# Patient Record
Sex: FEMALE | Race: WHITE | NOT HISPANIC OR LATINO | Employment: FULL TIME | URBAN - METROPOLITAN AREA
[De-identification: names, ages, dates, MRNs, and addresses within clinical notes are randomized per-mention and may not be internally consistent; named-entity substitution may affect disease eponyms.]

---

## 2022-03-08 ENCOUNTER — TELEPHONE (OUTPATIENT)
Dept: FAMILY MEDICINE CLINIC | Facility: CLINIC | Age: 22
End: 2022-03-08

## 2022-05-19 ENCOUNTER — TELEPHONE (OUTPATIENT)
Dept: FAMILY MEDICINE CLINIC | Facility: CLINIC | Age: 22
End: 2022-05-19

## 2022-05-19 NOTE — TELEPHONE ENCOUNTER
Caller: Mago Israel    Relationship: Self    Best call back number: [phone]    What form or medical record are you requesting: ACCOMODATION FORM    Who is requesting this form or medical record from you: EMPLOYER    How would you like to receive the form or medical records (pick-up, mail, fax): FAX  If fax, what is the fax number: NUMBER ON FORM    Timeframe paperwork needed: DUE Monday 5/23/22     Additional notes: PATIENT STATES THAT THEIR EMPLOYER IS REQUESTING AN UPDATE TO CLARIFY SPECIFICS OF ADDITIONAL BREAKS AT WORK. PATIENT REQUESTS THAT IT BE LISTED TO INCLUDE AN ADDITIONAL 15-20 MINUTES FOR A RESTROOM BREAK. CALL IF NECESSARY

## 2022-05-23 ENCOUNTER — TELEPHONE (OUTPATIENT)
Dept: FAMILY MEDICINE CLINIC | Facility: CLINIC | Age: 22
End: 2022-05-23

## 2022-05-23 NOTE — TELEPHONE ENCOUNTER
Caller: Mago Israel    Relationship to patient: Self    Best call back number:[phone]    Patient is needing: PATIENT IS CALLING TO CHECK THE STATUS OF AN ACCOMODATION FORM THAT SHE DROPPED OFF ON Friday 05/20/22.    PLEASE ADVISE.

## 2023-06-07 NOTE — TELEPHONE ENCOUNTER
Caller: JS     Relationship: [facility] INSURANCE AUTHORIZATIONS     Best call back number: [phone]    What test/procedure requested: CT OF ABDOMEN AND PELVIS WITHOUT CONTRAST      When is it needed: SCHEDULED 3-9-22     Where is the test/procedure going to be performed: [facility]     Additional information or concerns: DENIED BY INSURANCE. PLEASE CALL JS BY 2:00 TODAY, 3-8-22 AND ADVISE IF PROVIDER REQUESTS PEER TO PEER MEETING,  MAKE A CHANGE IN THE ORDER OR CANCEL PROCEDURE            Dr. Dionne Jeong

## 2023-11-11 NOTE — PROGRESS NOTES
"GYN Annual Exam     CC- Here for annual exam   Chief Complaint   Patient presents with    Gynecologic Exam     ANNUAL          Mago CORREA is a 23 y.o.  female who presents for annual well woman exam. No LMP recorded. Patient has had an implant.    Problems in addition to need for annual: pt desires Mirena IUD out, and wants to start progestin only pill.  Pt would also like to be tested for PCOS.      HPI: History of Present Illness    PMHX:    * No active hospital problems. *  ; otherwise none    OB History          0    Para   0    Term   0       0    AB   0    Living   0         SAB   0    IAB   0    Ectopic   0    Molar        Multiple   0    Live Births                      Past Medical History:   Diagnosis Date    Abnormal vaginal bleeding        No past surgical history on file.      Current Outpatient Medications:     norethindrone (MICRONOR) 0.35 MG tablet, Take 1 tablet by mouth Daily., Disp: 84 tablet, Rfl: 3    omeprazole (PrilOSEC) 20 MG capsule, Take 1 capsule by mouth Daily., Disp: 30 capsule, Rfl: 1    No Known Allergies    Social History     Tobacco Use    Smoking status: Every Day     Packs/day: 0.25     Years: 1.00     Additional pack years: 0.00     Total pack years: 0.25     Types: Cigarettes    Smokeless tobacco: Never   Substance Use Topics    Alcohol use: No    Drug use: No       Mago CORREA  reports that she has been smoking cigarettes. She has a 0.25 pack-year smoking history. She has never used smokeless tobacco..       Family History   Problem Relation Age of Onset    Other Mother         GYN PROBLEMS    Other Maternal Grandmother         GYN PROBLEMS    Other Paternal Grandmother         GYN PROBLEMS       Review of Systems    Patient reports that she is not currently experiencing any symptoms of urinary incontinence.      noTESTED FOR CHLAMYDIA?    EXAM:  /78   Ht 157.5 cm (62\")   Wt 95.9 kg (211 lb 6.4 oz)   BMI 38.67 kg/m²     Labs:   Lab Results (last 24 " hours)       Procedure Component Value Units Date/Time    POC Urinalysis Dipstick [[id number]]  (Normal) Collected: 11/13/23 1133    Specimen: Urine Updated: 11/13/23 1133     Color Yellow     Clarity, UA Clear     Glucose, UA Negative mg/dL      Bilirubin Negative     Ketones, UA Negative     Specific Gravity  1.005     Blood, UA Negative     pH, Urine 5.0     Protein, POC Negative mg/dL      Urobilinogen, UA Normal     Leukocytes Negative     Nitrite, UA Negative            Physical Exam  Vitals and nursing note reviewed. Exam conducted with a chaperone present.   Constitutional:       General: She is not in acute distress.     Appearance: She is well-developed. She is not diaphoretic.   HENT:      Head: Normocephalic and atraumatic.      Nose: Nose normal.   Eyes:      Extraocular Movements: Extraocular movements intact.   Cardiovascular:      Rate and Rhythm: Normal rate.   Pulmonary:      Effort: Pulmonary effort is normal.   Chest:   Breasts:     Breasts are symmetrical.      Right: Normal. No mass, nipple discharge, skin change or tenderness.      Left: Normal. No mass, nipple discharge, skin change or tenderness.   Abdominal:      General: There is no distension.      Palpations: Abdomen is soft. There is no mass.      Tenderness: There is no abdominal tenderness. There is no guarding.   Genitourinary:     General: Normal vulva.      Pubic Area: No rash.       Vagina: Normal. No vaginal discharge.      Cervix: Normal.      Uterus: Normal.       Adnexa: Right adnexa normal and left adnexa normal.   Musculoskeletal:         General: No tenderness or deformity. Normal range of motion.      Cervical back: Normal range of motion.   Lymphadenopathy:      Upper Body:      Right upper body: No axillary adenopathy.      Left upper body: No axillary adenopathy.   Skin:     General: Skin is warm and dry.      Coloration: Skin is not pale.      Findings: No erythema or rash.   Neurological:      Mental Status: She is  alert and oriented to person, place, and time.   Psychiatric:         Behavior: Behavior normal.         Thought Content: Thought content normal.         Judgment: Judgment normal.       IUD Removal Procedure Note    Chief Complaint: IUD removal    Procedures    Type of IUD:  Mirena  Date of insertion:  known  Reason for removal:   pt choice  Other relevant history/information:  none    Procedure Time Out Documentation      Procedure Details  IUD strings visible:  yes  Local anesthesia:  None  Tenaculum used:  None  Removal:  IUD strings grasped and IUD removed intact with gentle traction.  The patient tolerated the procedure well.    All appropriate instructions regarding removal were reviewed.    Tolerated well  No apparent complications  Post procedure diagnosis : IUD removal     Plans for contraception:  Micronor      Other follow-up needed:  none    The patient was advised to call for any fever or for prolonged or severe pain or bleeding. She was advised to use OTC ibuprofen as needed for mild to moderate pain.     As part of wellness and prevention, the following topics were discussed with the patient: healthy weight, substance abuse/misuse, mental health, encouraging self breast exam, and other counseling and guidance done:  Nutrition, physical activity, healthy weight, injury prevention, misuse of tobacco, alcohol and drugs, sexual behavior and STDs, contraception, dental health, mental health, immunizations breast cancer screening and exams.    Assessment     1) GYN annual well woman exam.   2) PAP done today? Yes  3) problems addressed: yes           Plan       Follow up prn or one year.    Pt instructed to call for results of any testing done today and that failure to call if she has not heard from us could result in inadequate treatment.  Pt verbalized her understanding.     Diagnoses and all orders for this visit:    1. Pap smear, as part of routine gynecological examination (Primary)  -      IGP,CtNgTv,rfx Aptima HPV ASCU    2. Routine gynecological examination  -     POC Urinalysis Dipstick  -     IGP,CtNgTv,rfx Aptima HPV ASCU    3. PCOS (polycystic ovarian syndrome)  -     17-Hydroxyprogesterone  -     DHEA-Sulfate  -     Estradiol  -     Follicle Stimulating Hormone  -     Glucose, Plasma ([facility])  -     Insulin, Total  -     Prolactin  -     TSH Rfx On Abnormal To Free T4  -     Testosterone Free Direct    4. Encounter for IUD removal    5. Encounter for initial prescription of contraceptive pills    Other orders  -     norethindrone (MICRONOR) 0.35 MG tablet; Take 1 tablet by mouth Daily.  Dispense: 84 tablet; Refill: 3        RTO Return if symptoms worsen or fail to improve, for Recheck.        Reymundo Silva MD  11/13/23  13:13 EST

## 2023-11-13 ENCOUNTER — OFFICE VISIT (OUTPATIENT)
Dept: OBSTETRICS AND GYNECOLOGY | Facility: CLINIC | Age: 23
End: 2023-11-13
Payer: COMMERCIAL

## 2023-11-13 VITALS
HEIGHT: 62 IN | BODY MASS INDEX: 38.9 KG/M2 | SYSTOLIC BLOOD PRESSURE: 126 MMHG | DIASTOLIC BLOOD PRESSURE: 78 MMHG | WEIGHT: 211.4 LBS

## 2023-11-13 DIAGNOSIS — Z30.432 ENCOUNTER FOR IUD REMOVAL: ICD-10-CM

## 2023-11-13 DIAGNOSIS — Z01.419 ROUTINE GYNECOLOGICAL EXAMINATION: ICD-10-CM

## 2023-11-13 DIAGNOSIS — E28.2 PCOS (POLYCYSTIC OVARIAN SYNDROME): ICD-10-CM

## 2023-11-13 DIAGNOSIS — Z30.011 ENCOUNTER FOR INITIAL PRESCRIPTION OF CONTRACEPTIVE PILLS: ICD-10-CM

## 2023-11-13 DIAGNOSIS — Z01.419 PAP SMEAR, AS PART OF ROUTINE GYNECOLOGICAL EXAMINATION: Primary | ICD-10-CM

## 2023-11-13 LAB
BILIRUB BLD-MCNC: NEGATIVE MG/DL
CLARITY, POC: CLEAR
COLOR UR: YELLOW
GLUCOSE UR STRIP-MCNC: NEGATIVE MG/DL
KETONES UR QL: NEGATIVE
LEUKOCYTE EST, POC: NEGATIVE
NITRITE UR-MCNC: NEGATIVE MG/ML
PH UR: 5 [PH] (ref 5–8)
PROT UR STRIP-MCNC: NEGATIVE MG/DL
RBC # UR STRIP: NEGATIVE /UL
SP GR UR: 1 (ref 1–1.03)
UROBILINOGEN UR QL: NORMAL

## 2023-11-13 RX ORDER — ACETAMINOPHEN AND CODEINE PHOSPHATE 120; 12 MG/5ML; MG/5ML
1 SOLUTION ORAL DAILY
Qty: 84 TABLET | Refills: 3 | Status: SHIPPED | OUTPATIENT
Start: 2023-11-13

## 2023-11-15 LAB
C TRACH RRNA CVX QL NAA+PROBE: NEGATIVE
CONV .: NORMAL
CYTOLOGIST CVX/VAG CYTO: NORMAL
CYTOLOGY CVX/VAG DOC CYTO: NORMAL
CYTOLOGY CVX/VAG DOC THIN PREP: NORMAL
DX ICD CODE: NORMAL
HIV 1 & 2 AB SER-IMP: NORMAL
N GONORRHOEA RRNA CVX QL NAA+PROBE: NEGATIVE
OTHER STN SPEC: NORMAL
STAT OF ADQ CVX/VAG CYTO-IMP: NORMAL
T VAGINALIS RRNA SPEC QL NAA+PROBE: NEGATIVE

## 2023-11-16 LAB
17OHP SERPL-MCNC: 62 NG/DL
DHEA-S SERPL-MCNC: 352 UG/DL (ref 84.8–378)
ESTRADIOL SERPL-MCNC: 40.6 PG/ML
FSH SERPL-ACNC: 5.7 MIU/ML
GLUCOSE P FAST SERPL-MCNC: 88 MG/DL (ref 65–99)
INSULIN SERPL-ACNC: 19.4 UIU/ML (ref 2.6–24.9)
PROLACTIN SERPL-MCNC: 17.8 NG/ML (ref 4.8–23.3)
TESTOST FREE SERPL-MCNC: 4.5 PG/ML (ref 0–4.2)
TSH SERPL DL<=0.005 MIU/L-ACNC: 3.62 UIU/ML (ref 0.27–4.2)

## 2024-01-16 ENCOUNTER — OFFICE VISIT (OUTPATIENT)
Dept: OBSTETRICS AND GYNECOLOGY | Facility: CLINIC | Age: 24
End: 2024-01-16
Payer: COMMERCIAL

## 2024-01-16 VITALS
SYSTOLIC BLOOD PRESSURE: 118 MMHG | BODY MASS INDEX: 38.02 KG/M2 | WEIGHT: 206.6 LBS | DIASTOLIC BLOOD PRESSURE: 82 MMHG | HEIGHT: 62 IN

## 2024-01-16 DIAGNOSIS — Z30.011 ENCOUNTER FOR INITIAL PRESCRIPTION OF CONTRACEPTIVE PILLS: ICD-10-CM

## 2024-01-16 DIAGNOSIS — N93.9 ABNORMAL UTERINE BLEEDING (AUB): Primary | ICD-10-CM

## 2024-01-16 DIAGNOSIS — E28.2 PCOS (POLYCYSTIC OVARIAN SYNDROME): ICD-10-CM

## 2024-01-16 RX ORDER — DROSPIRENONE AND ETHINYL ESTRADIOL 0.03MG-3MG
1 KIT ORAL DAILY
Qty: 84 TABLET | Refills: 3 | Status: SHIPPED | OUTPATIENT
Start: 2024-01-16

## 2024-01-16 RX ORDER — LISDEXAMFETAMINE DIMESYLATE CAPSULES 50 MG/1
50 CAPSULE ORAL
COMMUNITY
Start: 2024-01-11 | End: 2024-02-10

## 2024-01-16 NOTE — PROGRESS NOTES
"Subjective     Chief Complaint   Patient presents with    BLEEDING FOR A LONG AMOUNT OF TIME       Mago CORREA is a 23 y.o.  whose LMP is Patient's last menstrual period was 2023 (exact date).. This patient is new to me- yes, but is an established patient of this practice.  She presents with prolonged bleeding.     HPI    IUD removed 23; started Micronor at that time.  Started spotting prior to removal but never had a cycle with the IUD.  Cycle started 5 days later and ended on 23.  Was off cycle for 2 weeks then started again 23; consistent heavy bleeding (some clots) without a lot of cramping.  Bleeding through tampons and pads.  Cycle eventually stopped 24.  Reports \"bad\" cramps prior to IUD placement.  Wanted IUD removed due to mental health decline.  Pelvic US on day of IUD removal showed PCOS-like ovaries.  Placed on the mini pill because she wanted less hormones but has since had prolonged bleeding.  PCOS labs were drawn the day she had the IUD removed; labs were normal.  Took Enskyce when she was younger due to abnormal cycles.  Also reporting pain with sex with deep penetration or \"hitting the cervix\".          The following portions of the patient's history were reviewed and updated as appropriate:vital signs, allergies, current medications, past family history, past medical history, past social history, past surgical history, and problem list      Review of Systems     Review of Systems   Eyes:  Negative for visual disturbance.   Genitourinary:  Positive for dyspareunia, menstrual problem and pelvic pain. Negative for vaginal discharge.   Neurological:  Negative for headache.       Objective      /82   Ht 157.5 cm (62\")   Wt 93.7 kg (206 lb 9.6 oz)   LMP 2023 (Exact Date)   BMI 37.79 kg/m²     Physical Exam    Physical Exam  Vitals reviewed.   Constitutional:       General: She is awake. She is not in acute distress.     Appearance: She is obese. She is " not ill-appearing.   Eyes:      Conjunctiva/sclera: Conjunctivae normal.   Pulmonary:      Effort: Pulmonary effort is normal. No respiratory distress.   Musculoskeletal:      Cervical back: Neck supple. No rigidity.   Skin:     General: Skin is warm and dry.      Capillary Refill: Capillary refill takes less than 2 seconds.   Neurological:      Mental Status: She is alert and oriented to person, place, and time.   Psychiatric:         Mood and Affect: Mood and affect normal.         Behavior: Behavior normal.           Lab Review   Labs: No data reviewed     Imaging: No data reviewed      Assessment/Plan  Diagnoses and all orders for this visit:    1. Abnormal uterine bleeding (AUB) (Primary)  -     GLORIA  -     Anticardiolipin Antibody, IgG / M, Qn  -     Antithrombin III  -     Factor 5 Leiden  -     Factor II, DNA Analysis  -     Protein C Deficiency Profile  -     Protein S Panel    2. Encounter for initial prescription of contraceptive pills  -     drospirenone-ethinyl estradiol (MAE,OCELLA) 3-0.03 MG per tablet; Take 1 tablet by mouth Daily.  Dispense: 84 tablet; Refill: 3    3. PCOS (polycystic ovarian syndrome)  -     drospirenone-ethinyl estradiol (MAE,OCELLA) 3-0.03 MG per tablet; Take 1 tablet by mouth Daily.  Dispense: 84 tablet; Refill: 3    Check AUB labs and thrombophilia panel.  Instructed to stop taking Micronor; agrees to try birth control with estrogen hormone.  Reviewed ACHES and warning signs.      Return in about 3 months (around 4/16/2024), or if needed.    I spent 30 minutes caring for Mago on this date of service. This time includes time spent by me in the following activities: preparing for the visit, reviewing tests, obtaining and/or reviewing a separately obtained history, performing a medically appropriate examination and/or evaluation, counseling and educating the patient/family/caregiver, ordering medications, tests, or procedures, and documenting information in the medical  record. This time does not include time spent performing the Ultrasound.    Juliet Mc, APRN  1/17/2024  13:14 EST

## 2024-01-25 LAB
ANA SER QL: NEGATIVE
AT III ACT/NOR PPP CHRO: 110 % (ref 75–135)
CARDIOLIPIN IGG SER IA-ACNC: <9 GPL U/ML (ref 0–14)
CARDIOLIPIN IGM SER IA-ACNC: <9 MPL U/ML (ref 0–12)
F2 C.20210G>A GENO BLD/T: NORMAL
F5 P.R506Q BLD/T QL: NORMAL
IMP & REVIEW OF LAB RESULTS: NORMAL
IMP & REVIEW OF LAB RESULTS: NORMAL
PROT C ACT/NOR PPP: 97 % (ref 73–180)
PROT C AG ACT/NOR PPP IA: 75 % (ref 60–150)
PROT S ACT/NOR PPP: 118 % (ref 63–140)
PROT S AG ACT/NOR PPP IA: 132 % (ref 60–150)
PROT S FREE AG ACT/NOR PPP IA: 124 % (ref 61–136)

## 2024-04-12 ENCOUNTER — OFFICE VISIT (OUTPATIENT)
Dept: OBSTETRICS AND GYNECOLOGY | Facility: CLINIC | Age: 24
End: 2024-04-12
Payer: COMMERCIAL

## 2024-04-12 VITALS
BODY MASS INDEX: 34.52 KG/M2 | HEIGHT: 62 IN | WEIGHT: 187.6 LBS | SYSTOLIC BLOOD PRESSURE: 136 MMHG | DIASTOLIC BLOOD PRESSURE: 94 MMHG

## 2024-04-12 DIAGNOSIS — N89.8 VAGINAL DISCHARGE: ICD-10-CM

## 2024-04-12 DIAGNOSIS — Z31.9 PATIENT DESIRES PREGNANCY: ICD-10-CM

## 2024-04-12 DIAGNOSIS — E28.2 PCOS (POLYCYSTIC OVARIAN SYNDROME): ICD-10-CM

## 2024-04-12 DIAGNOSIS — N73.0 ACUTE PID (PELVIC INFLAMMATORY DISEASE): Primary | ICD-10-CM

## 2024-04-12 PROBLEM — Z30.011 ENCOUNTER FOR INITIAL PRESCRIPTION OF CONTRACEPTIVE PILLS: Status: RESOLVED | Noted: 2023-11-13 | Resolved: 2024-04-12

## 2024-04-12 LAB
BILIRUB BLD-MCNC: NEGATIVE MG/DL
CLARITY, POC: CLEAR
COLOR UR: YELLOW
GLUCOSE UR STRIP-MCNC: NEGATIVE MG/DL
KETONES UR QL: NEGATIVE
LEUKOCYTE EST, POC: NEGATIVE
NITRITE UR-MCNC: NEGATIVE MG/ML
PH UR: 5 [PH] (ref 5–8)
PROT UR STRIP-MCNC: ABNORMAL MG/DL
RBC # UR STRIP: ABNORMAL /UL
SP GR UR: 1 (ref 1–1.03)
UROBILINOGEN UR QL: NORMAL

## 2024-04-12 RX ORDER — METRONIDAZOLE 500 MG/1
500 TABLET ORAL 2 TIMES DAILY
Qty: 28 TABLET | Refills: 0 | Status: SHIPPED | OUTPATIENT
Start: 2024-04-12 | End: 2024-04-26

## 2024-04-12 RX ORDER — DOXYCYCLINE HYCLATE 100 MG
100 TABLET ORAL 2 TIMES DAILY
Qty: 28 TABLET | Refills: 0 | Status: SHIPPED | OUTPATIENT
Start: 2024-04-12 | End: 2024-04-26

## 2024-04-12 NOTE — PROGRESS NOTES
"Subjective     Chief Complaint   Patient presents with    Follow-up     AUB    Vaginitis       Mago CORREA is a 23 y.o.  whose LMP is No LMP recorded. (Menstrual status: Oral contraceptives).. This patient is new to me- no.  She presents with vaginal discharge    HPI    Started on ocp 3 months ago for AUB.  Hx of PCOS.  OCP helped to regulate her cycle; she has one more placebo to take and then she will stop the pill.  Desires pregnancy.  Developed vaginal discharge 2-3 weeks ago that is green.  Initially would come and go but now seems to be constant.  Some odor with mild burning; denies dysuria or concern for STI.   Back and lower abd pain present.      The following portions of the patient's history were reviewed and updated as appropriate:vital signs, allergies, current medications, past family history, past medical history, past social history, past surgical history, and problem list      Review of Systems     Review of Systems   Constitutional:  Positive for chills.   Gastrointestinal:  Positive for abdominal pain.   Genitourinary:  Positive for dyspareunia, pelvic pain and vaginal discharge. Negative for dysuria and menstrual problem.   Musculoskeletal:  Positive for back pain.       Objective      /94   Ht 157.5 cm (62\")   Wt 85.1 kg (187 lb 9.6 oz)   BMI 34.31 kg/m²     Physical Exam    Physical Exam  Vitals reviewed.   Constitutional:       General: She is awake. She is not in acute distress.     Appearance: She is not ill-appearing.   Eyes:      Conjunctiva/sclera: Conjunctivae normal.   Pulmonary:      Effort: Pulmonary effort is normal. No respiratory distress.   Genitourinary:     General: Normal vulva.      Exam position: Supine.      Labia:         Right: No rash.         Left: No rash.       Vagina: Bleeding (on menses) present.      Cervix: Cervical motion tenderness present. No erythema.      Uterus: Tender.       Adnexa: Right adnexa normal and left adnexa normal. "   Musculoskeletal:      Cervical back: Neck supple. No rigidity.   Lymphadenopathy:      Lower Body: No right inguinal adenopathy. No left inguinal adenopathy.   Skin:     General: Skin is warm and dry.      Capillary Refill: Capillary refill takes less than 2 seconds.   Neurological:      Mental Status: She is alert and oriented to person, place, and time.   Psychiatric:         Mood and Affect: Mood and affect normal.         Behavior: Behavior normal.           Lab Review   Labs: Urinalysis - with micro     Imaging: No data reviewed      Assessment/Plan  Diagnoses and all orders for this visit:    1. Acute PID (pelvic inflammatory disease) (Primary)  -     metroNIDAZOLE (FLAGYL) 500 MG tablet; Take 1 tablet by mouth 2 (Two) Times a Day for 14 days.  Dispense: 28 tablet; Refill: 0  -     doxycycline (VIBRAMYICN) 100 MG tablet; Take 1 tablet by mouth 2 (Two) Times a Day for 14 days.  Dispense: 28 tablet; Refill: 0    2. Vaginal discharge  -     NuSwab BV & Candida - Swab, Vagina  -     Genital Mycoplasmas CHAU, Swab - Swab, Vagina  -     POC Urinalysis Dipstick    3. PCOS (polycystic ovarian syndrome)    4. Patient desires pregnancy      U/A neg for leukocytes.  Check Nuswab/Mycoplasma.  Concerned for PID- + CMT on PE.  ERX Flagyl with Doxy.  Recommend no sex until finished with antibiotics.      Return if symptoms worsen or fail to improve.    I spent 15 minutes caring for Mago on this date of service. This time includes time spent by me in the following activities: preparing for the visit, reviewing tests, obtaining and/or reviewing a separately obtained history, performing a medically appropriate examination and/or evaluation, counseling and educating the patient/family/caregiver, ordering medications, tests, or procedures, and documenting information in the medical record.     Juliet Mc, APRN  4/12/2024  10:12 EDT   ED MD

## 2024-04-17 LAB
A VAGINAE DNA VAG QL NAA+PROBE: NORMAL SCORE
BVAB2 DNA VAG QL NAA+PROBE: NORMAL SCORE
C ALBICANS DNA VAG QL NAA+PROBE: NEGATIVE
C GLABRATA DNA VAG QL NAA+PROBE: NEGATIVE
M GENITALIUM DNA SPEC QL NAA+PROBE: NEGATIVE
M HOMINIS DNA SPEC QL NAA+PROBE: NEGATIVE
MEGA1 DNA VAG QL NAA+PROBE: NORMAL SCORE
UREAPLASMA DNA SPEC QL NAA+PROBE: POSITIVE